# Patient Record
(demographics unavailable — no encounter records)

---

## 2025-06-27 NOTE — HISTORY OF PRESENT ILLNESS
[TextBox_4] : 63-year-old male with severe obstructive lung disease currently on Stiolto, 2 elations daily and Arnuity 1 elation daily with as needed albuterol.  Since I last saw him he has lost more than 40 pounds using Zepbound and his breathing is much improved.  He has not had very little need for his albuterol.  His last chest CT in July 2024 had no evidence of suspicious lesions.  He has greater than 40-pack-year history of smoking and he continues to vape.  His only complaint currently is arthritis in his left knee and loss of fingernails on 4 fingers of his left hand.

## 2025-06-27 NOTE — ASSESSMENT
[FreeTextEntry1] : 63-year-old with advanced emphysema currently being controlled with triple therapy.  Compliant with annual lung cancer screening.  Due for 1 next month.  Continue current medications.  After walking more than 300 feet, his oxygen saturations were maintained at 97%.  Circulation to both hands is satisfactory.  Fingernail issue likely a local problem, questionable fungal.  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  He wears gloves at work.  Suggest dermatology consult
